# Patient Record
Sex: FEMALE | Race: BLACK OR AFRICAN AMERICAN | Employment: STUDENT | URBAN - METROPOLITAN AREA
[De-identification: names, ages, dates, MRNs, and addresses within clinical notes are randomized per-mention and may not be internally consistent; named-entity substitution may affect disease eponyms.]

---
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
## 2022-01-11 PROBLEM — M24.542 CONTRACTURE OF FINGER JOINT, LEFT: Status: ACTIVE | Noted: 2022-01-11

## 2025-01-16 ENCOUNTER — APPOINTMENT (OUTPATIENT)
Dept: DERMATOLOGY | Age: 25
End: 2025-01-16

## (undated) NOTE — LETTER
[facility], [facility] [address] [address]  Dept: [phone]  Dept Fax: [phone]      March 17, 2018    Patient: Talha Meron   Date of Visit: 3/17/2018       To Whom It May Concern:    Talha Chance was seen and tr